# Patient Record
Sex: FEMALE | Race: WHITE | Employment: UNEMPLOYED | URBAN - METROPOLITAN AREA
[De-identification: names, ages, dates, MRNs, and addresses within clinical notes are randomized per-mention and may not be internally consistent; named-entity substitution may affect disease eponyms.]

---

## 2019-12-03 NOTE — PROGRESS NOTES
"Subjective     Pao Rizzo is a 59 year old female who presents to clinic today for the following health issues:    HPI   Est. Care. No primary care in the past 5-10 years.    Never had colonoscopy. No fam hx of colon cancer  Had mammogram around age 40. No fam hx of breast cancer  Pap/pelvic last done- when had ablation in .     Abnormal Mood Symptoms- states it is anxiety  Onset: \"more pronouced in the last two years\"   Had \"difficult boss\" past few years. Then last year mother  and at the same time got put \"on notice\".  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   Has had unemployment. She has not been looking for other work.   So exhausted initially, slept a lot and describes almost couldn't leave the house.   Got somewhat better over time  When asked what she has spent the last year doing has hard time describing. States hard to go out- \"crowds\" and social things bother her. She has not been exercising.   Lives w/fiance. He told her \"to get anxiety under control\"  States her anxiety was preventing her from look for work.   States she was drinking due to anxiety.   Does not think she would describe herself as depressed.   Drinking- she states she stopped 2 weeks ago. Was drinking 3-4x per week - \"few glasses\" of wine per day. Admits she did have some withdrawal sx when she stopped.   Mood- \"has been better since not drinking to cope.\"  Not sleeping well. Waking all the time.   Appetite is gone when she is anxious. But thinks lately better since quit drinking.   She is worried about her weight loss. Wondering \"if I should have some labs\".     Fam hx- dad schizophrenic.     Denies prior dx of depression or anxiety but states someone tried her on sertraline- \"it made me goofy. I won't take anti-depressants\".   States she wants something \"for when I need it\". \"Has a niece who has something she takes to calm her down when she goes out.\" wants something " like that. Although admits she has daily sx that impair function and have impaired her ability to start looking for work in the last year.       Description:   Depression: no   Anxiety: YES    Accompanying Signs & Symptoms:  Still participating in activities that you used to enjoy: YES  Fatigue: no   Irritability: no   Difficulty concentrating: no   Changes in appetite: YES  Problems with sleep: YES  Heart racing/beating fast : YES  Thoughts of hurting yourself or others: none    History:   Recent stress: YES  Prior depression hospitalization: None  Family history of depression: YES- father  Family history of anxiety: YES- father     Precipitating factors:   Alcohol/drug use: YES- in the past     Alleviating factors:    Therapies Tried and outcome: Zoloft (Sertraline)- pt dont like it.       PHQ-9 SCORE 12/4/2019   PHQ-9 Total Score MyChart 8 (Mild depression)   PHQ-9 Total Score 8     AGATA-7 SCORE 12/4/2019   Total Score 8 (mild anxiety)   Total Score 8       There is no problem list on file for this patient.    Past Surgical History:   Procedure Laterality Date     AS ABLATION, ENDOMETRIAL, THERMAL, W/O HYSTEROSCOPIC GUIDANCE  2007     BACK SURGERY  1990       Social History     Tobacco Use     Smoking status: Never Smoker     Smokeless tobacco: Never Used   Substance Use Topics     Alcohol use: Not Currently     Family History   Problem Relation Age of Onset     Heart Disease Father      Mental Illness Father          No current outpatient medications on file.     Allergies   Allergen Reactions     Aminobenzoate      No Clinical Screening - See Comments      PN: LW CM1: CONTRAST- NKA Reaction :     Triprolidine-Pse      Other reaction(s): *Unknown  reaction not stated     Sertraline Rash     Sulfa Drugs Rash     BP Readings from Last 3 Encounters:   12/04/19 130/82    Wt Readings from Last 3 Encounters:   12/04/19 54.4 kg (120 lb)                    Reviewed and updated as needed this visit by Provider      "    Review of Systems   ROS COMP: Constitutional, HEENT, cardiovascular, pulmonary, gi and gu systems are negative, except as otherwise noted.      Objective    /82   Pulse 88   Temp 97.8  F (36.6  C) (Oral)   Resp 16   Ht 1.715 m (5' 7.5\")   Wt 54.4 kg (120 lb)   SpO2 96%   BMI 18.52 kg/m    Body mass index is 18.52 kg/m .  Physical Exam   GENERAL: healthy, alert and no distress  EYES: Eyes grossly normal to inspection, PERRL and conjunctivae and sclerae normal  NECK: no adenopathy, no asymmetry, masses, or scars and thyroid normal to palpation  RESP: lungs clear to auscultation - no rales, rhonchi or wheezes  CV: regular rate and rhythm, normal S1 S2, no S3 or S4, no murmur, click or rub, no peripheral edema and peripheral pulses strong  ABDOMEN: slender, soft, nontender, possible enlarged liver, no masses and bowel sounds normal  MS: no gross musculoskeletal defects noted, no edema  NEURO: Normal strength and tone, mentation intact and speech normal  PSYCH: mentation appears normal, affect guarded. Eye contact intermittent. No SI/HI. Neatly groomed, casually dressed.     Diagnostic Test Results:  Labs reviewed in Epic  Results for orders placed or performed in visit on 12/04/19   Lipid panel reflex to direct LDL Non-fasting     Status: Abnormal   Result Value Ref Range    Cholesterol 206 (H) <200 mg/dL    Triglycerides 145 <150 mg/dL    HDL Cholesterol 68 >49 mg/dL    LDL Cholesterol Calculated 109 (H) <100 mg/dL    Non HDL Cholesterol 138 (H) <130 mg/dL   Comprehensive metabolic panel (BMP + Alb, Alk Phos, ALT, AST, Total. Bili, TP)     Status: Abnormal   Result Value Ref Range    Sodium 136 133 - 144 mmol/L    Potassium 3.6 3.4 - 5.3 mmol/L    Chloride 103 94 - 109 mmol/L    Carbon Dioxide 29 20 - 32 mmol/L    Anion Gap 4 3 - 14 mmol/L    Glucose 95 70 - 99 mg/dL    Urea Nitrogen 5 (L) 7 - 30 mg/dL    Creatinine 0.61 0.52 - 1.04 mg/dL    GFR Estimate >90 >60 mL/min/[1.73_m2]    GFR Estimate If " "Black >90 >60 mL/min/[1.73_m2]    Calcium 9.2 8.5 - 10.1 mg/dL    Bilirubin Total 1.1 0.2 - 1.3 mg/dL    Albumin 3.9 3.4 - 5.0 g/dL    Protein Total 7.6 6.8 - 8.8 g/dL    Alkaline Phosphatase 125 40 - 150 U/L     (H) 0 - 50 U/L     (H) 0 - 45 U/L   TSH with free T4 reflex     Status: None   Result Value Ref Range    TSH 1.71 0.40 - 4.00 mU/L   CBC with platelets and differential     Status: Abnormal   Result Value Ref Range    WBC 5.2 4.0 - 11.0 10e9/L    RBC Count 3.86 3.8 - 5.2 10e12/L    Hemoglobin 12.8 11.7 - 15.7 g/dL    Hematocrit 37.8 35.0 - 47.0 %    MCV 98 78 - 100 fl    MCH 33.2 (H) 26.5 - 33.0 pg    MCHC 33.9 31.5 - 36.5 g/dL    RDW 13.3 10.0 - 15.0 %    Platelet Count 138 (L) 150 - 450 10e9/L    % Neutrophils 64.9 %    % Lymphocytes 20.7 %    % Monocytes 13.2 %    % Eosinophils 0.8 %    % Basophils 0.4 %    Absolute Neutrophil 3.4 1.6 - 8.3 10e9/L    Absolute Lymphocytes 1.1 0.8 - 5.3 10e9/L    Absolute Monocytes 0.7 0.0 - 1.3 10e9/L    Absolute Eosinophils 0.0 0.0 - 0.7 10e9/L    Absolute Basophils 0.0 0.0 - 0.2 10e9/L    Diff Method Automated Method            Assessment & Plan     1. Anxiety  Discussed pts anxiety at length  She was let go from her job almost 1 yr ago and has not started looking for work.   Admits to moderate level of sx that impair daily function but is adamant she will not try an antidepressant.  Only wants \"something for when I need it\". Discuss with her degree of sx that would be daily.   She declines serotonin specific reuptake inhibitor, declines buspar    Offered referral to psych  in [place]- declines  She is interested in starting therapy- referral placed  Congratulated on stopping drinking- advised continued sobriety   - MENTAL HEALTH REFERRAL  - Adult; Outpatient Treatment; Individual/Couples/Family/Group Therapy/Health Psychology; [facility]: [facility] [phone]; We will contact you to schedule the appointment or please call with any " questions    2. Alcohol abuse  Likely alcohol abuse/dependence by based on the limited hx she provided and her admission of withdrawal sx  Also labs returned with significant elevations in ALT/AST. Pattern of AST> ALT typical of alcohol abuse and low platelets. This will need further evaluation    3. Screening for malignant neoplasm of breast  Pt overdue for routine maintenance  Advised mammogram. Order placed   - MA SCREENING DIGITAL BILAT - Future  (s+30); Future    4. Special screening for malignant neoplasms, colon  Pt has never had colon cancer screening  Discussed colonoscopy vs FIT test. She declines scope, is amenable to FIT test. Discussed a positive FIT is followed up w/diagnostic scope.  - Fecal colorectal cancer screen FIT; Future    5. Weight loss  She thinks she has had unplanned weight loss over the last year. She thinks it is due to her anxiety but is concerned could be related to other causes.  Advised updating cancer screenings.  Labs as below  Follow up pending eval   - Comprehensive metabolic panel (BMP + Alb, Alk Phos, ALT, AST, Total. Bili, TP)  - TSH with free T4 reflex  - CBC with platelets and differential    6. Screening for hyperlipidemia  - Lipid panel reflex to direct LDL Non-fasting    7. Need for vaccination  Given tdap  She declines flu shot   - TDAP VACCINE (ADACEL) [18604.002]  - 1st  Administration  [75550]       Follow Up: The patient was instructed to contact clinic for worsening symptoms, non-improvement as expected/discussed, and for questions regarding medications or treatment plan. Discussed parameters for follow up and included in After Visit Summary given to patient.      Return in about 4 weeks (around 1/1/2020).    Jalyn Lepe PA-C  [facility]    Answers for HPI/ROS submitted by the patient on 12/4/2019   If you checked off any problems, how difficult have these problems made it for you to do your work, take care of things at home, or get along with  other people?: Very difficult  PHQ9 TOTAL SCORE: 8  AGATA 7 TOTAL SCORE: 8

## 2019-12-04 ENCOUNTER — OFFICE VISIT (OUTPATIENT)
Dept: FAMILY MEDICINE | Facility: CLINIC | Age: 59
End: 2019-12-04
Payer: COMMERCIAL

## 2019-12-04 VITALS
HEART RATE: 88 BPM | SYSTOLIC BLOOD PRESSURE: 130 MMHG | RESPIRATION RATE: 16 BRPM | WEIGHT: 120 LBS | OXYGEN SATURATION: 96 % | TEMPERATURE: 97.8 F | DIASTOLIC BLOOD PRESSURE: 82 MMHG | BODY MASS INDEX: 18.19 KG/M2 | HEIGHT: 68 IN

## 2019-12-04 DIAGNOSIS — Z23 NEED FOR VACCINATION: ICD-10-CM

## 2019-12-04 DIAGNOSIS — F10.10 ALCOHOL ABUSE: ICD-10-CM

## 2019-12-04 DIAGNOSIS — Z12.39 SCREENING FOR MALIGNANT NEOPLASM OF BREAST: ICD-10-CM

## 2019-12-04 DIAGNOSIS — F41.9 ANXIETY: Primary | ICD-10-CM

## 2019-12-04 DIAGNOSIS — Z12.11 SPECIAL SCREENING FOR MALIGNANT NEOPLASMS, COLON: ICD-10-CM

## 2019-12-04 DIAGNOSIS — Z13.220 SCREENING FOR HYPERLIPIDEMIA: ICD-10-CM

## 2019-12-04 DIAGNOSIS — R63.4 WEIGHT LOSS: ICD-10-CM

## 2019-12-04 LAB
ALBUMIN SERPL-MCNC: 3.9 G/DL (ref 3.4–5)
ALP SERPL-CCNC: 125 U/L (ref 40–150)
ALT SERPL W P-5'-P-CCNC: 256 U/L (ref 0–50)
ANION GAP SERPL CALCULATED.3IONS-SCNC: 4 MMOL/L (ref 3–14)
AST SERPL W P-5'-P-CCNC: 342 U/L (ref 0–45)
BASOPHILS # BLD AUTO: 0 10E9/L (ref 0–0.2)
BASOPHILS NFR BLD AUTO: 0.4 %
BILIRUB SERPL-MCNC: 1.1 MG/DL (ref 0.2–1.3)
BUN SERPL-MCNC: 5 MG/DL (ref 7–30)
CALCIUM SERPL-MCNC: 9.2 MG/DL (ref 8.5–10.1)
CHLORIDE SERPL-SCNC: 103 MMOL/L (ref 94–109)
CHOLEST SERPL-MCNC: 206 MG/DL
CO2 SERPL-SCNC: 29 MMOL/L (ref 20–32)
CREAT SERPL-MCNC: 0.61 MG/DL (ref 0.52–1.04)
DIFFERENTIAL METHOD BLD: ABNORMAL
EOSINOPHIL # BLD AUTO: 0 10E9/L (ref 0–0.7)
EOSINOPHIL NFR BLD AUTO: 0.8 %
ERYTHROCYTE [DISTWIDTH] IN BLOOD BY AUTOMATED COUNT: 13.3 % (ref 10–15)
GFR SERPL CREATININE-BSD FRML MDRD: >90 ML/MIN/{1.73_M2}
GLUCOSE SERPL-MCNC: 95 MG/DL (ref 70–99)
HCT VFR BLD AUTO: 37.8 % (ref 35–47)
HDLC SERPL-MCNC: 68 MG/DL
HGB BLD-MCNC: 12.8 G/DL (ref 11.7–15.7)
LDLC SERPL CALC-MCNC: 109 MG/DL
LYMPHOCYTES # BLD AUTO: 1.1 10E9/L (ref 0.8–5.3)
LYMPHOCYTES NFR BLD AUTO: 20.7 %
MCH RBC QN AUTO: 33.2 PG (ref 26.5–33)
MCHC RBC AUTO-ENTMCNC: 33.9 G/DL (ref 31.5–36.5)
MCV RBC AUTO: 98 FL (ref 78–100)
MONOCYTES # BLD AUTO: 0.7 10E9/L (ref 0–1.3)
MONOCYTES NFR BLD AUTO: 13.2 %
NEUTROPHILS # BLD AUTO: 3.4 10E9/L (ref 1.6–8.3)
NEUTROPHILS NFR BLD AUTO: 64.9 %
NONHDLC SERPL-MCNC: 138 MG/DL
PLATELET # BLD AUTO: 138 10E9/L (ref 150–450)
POTASSIUM SERPL-SCNC: 3.6 MMOL/L (ref 3.4–5.3)
PROT SERPL-MCNC: 7.6 G/DL (ref 6.8–8.8)
RBC # BLD AUTO: 3.86 10E12/L (ref 3.8–5.2)
SODIUM SERPL-SCNC: 136 MMOL/L (ref 133–144)
TRIGL SERPL-MCNC: 145 MG/DL
TSH SERPL DL<=0.005 MIU/L-ACNC: 1.71 MU/L (ref 0.4–4)
WBC # BLD AUTO: 5.2 10E9/L (ref 4–11)

## 2019-12-04 PROCEDURE — 80061 LIPID PANEL: CPT | Performed by: PHYSICIAN ASSISTANT

## 2019-12-04 PROCEDURE — 36415 COLL VENOUS BLD VENIPUNCTURE: CPT | Performed by: PHYSICIAN ASSISTANT

## 2019-12-04 PROCEDURE — 80050 GENERAL HEALTH PANEL: CPT | Performed by: PHYSICIAN ASSISTANT

## 2019-12-04 PROCEDURE — 99204 OFFICE O/P NEW MOD 45 MIN: CPT | Mod: 25 | Performed by: PHYSICIAN ASSISTANT

## 2019-12-04 PROCEDURE — 96127 BRIEF EMOTIONAL/BEHAV ASSMT: CPT | Performed by: PHYSICIAN ASSISTANT

## 2019-12-04 PROCEDURE — 90471 IMMUNIZATION ADMIN: CPT | Performed by: PHYSICIAN ASSISTANT

## 2019-12-04 PROCEDURE — 90715 TDAP VACCINE 7 YRS/> IM: CPT | Performed by: PHYSICIAN ASSISTANT

## 2019-12-04 ASSESSMENT — MIFFLIN-ST. JEOR: SCORE: 1159.88

## 2019-12-04 ASSESSMENT — ANXIETY QUESTIONNAIRES
2. NOT BEING ABLE TO STOP OR CONTROL WORRYING: MORE THAN HALF THE DAYS
5. BEING SO RESTLESS THAT IT IS HARD TO SIT STILL: NOT AT ALL
4. TROUBLE RELAXING: MORE THAN HALF THE DAYS
3. WORRYING TOO MUCH ABOUT DIFFERENT THINGS: MORE THAN HALF THE DAYS
GAD7 TOTAL SCORE: 8
6. BECOMING EASILY ANNOYED OR IRRITABLE: NOT AT ALL
1. FEELING NERVOUS, ANXIOUS, OR ON EDGE: MORE THAN HALF THE DAYS
7. FEELING AFRAID AS IF SOMETHING AWFUL MIGHT HAPPEN: NOT AT ALL
7. FEELING AFRAID AS IF SOMETHING AWFUL MIGHT HAPPEN: NOT AT ALL

## 2019-12-04 ASSESSMENT — PAIN SCALES - GENERAL: PAINLEVEL: NO PAIN (0)

## 2019-12-04 ASSESSMENT — PATIENT HEALTH QUESTIONNAIRE - PHQ9
SUM OF ALL RESPONSES TO PHQ QUESTIONS 1-9: 8
SUM OF ALL RESPONSES TO PHQ QUESTIONS 1-9: 8
10. IF YOU CHECKED OFF ANY PROBLEMS, HOW DIFFICULT HAVE THESE PROBLEMS MADE IT FOR YOU TO DO YOUR WORK, TAKE CARE OF THINGS AT HOME, OR GET ALONG WITH OTHER PEOPLE: VERY DIFFICULT

## 2019-12-04 NOTE — LETTER
"December 6, 2019      Pao Rizzo  [address] [place] [address]        Dear ,    We are writing to inform you of your test results.    Test results indicate you may require additional follow up, see comment below.      Your liver tests ALT and AST were abnormal. This indicates inflammation and damage in the liver.  Normal enzyme levels are roughly below 50. Your AST was 342 and the ALT was 256. This should have further evaluation. I would like to see you back for a follow up visit to discuss and make a plan further evaluation. You mentioned you quit drinking alcohol a few weeks ago. I would strongly advise abstinence from all alcohol.      Your platelet levels (platelets form the plug with cuts) were mildly low and this can be from liver impairment or other causes. Can discuss at follow up. At this level, this is not causing you any noticeable symptoms.      Your thyroid test was normal.    Your kidney labs were normal.    Cholesterol was normal. Interpreting your Cholesterol Profile Results: The LDL is the \"bad\" cholesterol that can clog the arteries.  The HDL is protective or \"good cholesterol\"; exercise, a \"mediterranean diet\" and weight management can boost this level. Triglycerides are the sugary-fats in the blood; a diet high in carbohydrates (bread, pasta, rice, chips, sweets) and alcohol can raise this level. The ratio of HDL to total cholesterol under 5.0 is optimal.            Resulted Orders   Lipid panel reflex to direct LDL Non-fasting   Result Value Ref Range    Cholesterol 206 (H) <200 mg/dL      Comment:      Desirable:       <200 mg/dl    Triglycerides 145 <150 mg/dL    HDL Cholesterol 68 >49 mg/dL    LDL Cholesterol Calculated 109 (H) <100 mg/dL      Comment:      Above desirable:  100-129 mg/dl  Borderline High:  130-159 mg/dL  High:             160-189 mg/dL  Very high:       >189 mg/dl      Non HDL Cholesterol 138 (H) <130 mg/dL      Comment:      Above Desirable:  " 130-159 mg/dl  Borderline high:  160-189 mg/dl  High:             190-219 mg/dl  Very high:       >219 mg/dl     Comprehensive metabolic panel (BMP + Alb, Alk Phos, ALT, AST, Total. Bili, TP)   Result Value Ref Range    Sodium 136 133 - 144 mmol/L    Potassium 3.6 3.4 - 5.3 mmol/L    Chloride 103 94 - 109 mmol/L    Carbon Dioxide 29 20 - 32 mmol/L    Anion Gap 4 3 - 14 mmol/L    Glucose 95 70 - 99 mg/dL    Urea Nitrogen 5 (L) 7 - 30 mg/dL    Creatinine 0.61 0.52 - 1.04 mg/dL    GFR Estimate >90 >60 mL/min/[1.73_m2]      Comment:      Non  GFR Calc  Starting 12/18/2018, serum creatinine based estimated GFR (eGFR) will be   calculated using the Chronic Kidney Disease Epidemiology Collaboration   (CKD-EPI) equation.      GFR Estimate If Black >90 >60 mL/min/[1.73_m2]      Comment:       GFR Calc  Starting 12/18/2018, serum creatinine based estimated GFR (eGFR) will be   calculated using the Chronic Kidney Disease Epidemiology Collaboration   (CKD-EPI) equation.      Calcium 9.2 8.5 - 10.1 mg/dL    Bilirubin Total 1.1 0.2 - 1.3 mg/dL    Albumin 3.9 3.4 - 5.0 g/dL    Protein Total 7.6 6.8 - 8.8 g/dL    Alkaline Phosphatase 125 40 - 150 U/L     (H) 0 - 50 U/L     (H) 0 - 45 U/L   TSH with free T4 reflex   Result Value Ref Range    TSH 1.71 0.40 - 4.00 mU/L   CBC with platelets and differential   Result Value Ref Range    WBC 5.2 4.0 - 11.0 10e9/L    RBC Count 3.86 3.8 - 5.2 10e12/L    Hemoglobin 12.8 11.7 - 15.7 g/dL    Hematocrit 37.8 35.0 - 47.0 %    MCV 98 78 - 100 fl    MCH 33.2 (H) 26.5 - 33.0 pg    MCHC 33.9 31.5 - 36.5 g/dL    RDW 13.3 10.0 - 15.0 %    Platelet Count 138 (L) 150 - 450 10e9/L    % Neutrophils 64.9 %    % Lymphocytes 20.7 %    % Monocytes 13.2 %    % Eosinophils 0.8 %    % Basophils 0.4 %    Absolute Neutrophil 3.4 1.6 - 8.3 10e9/L    Absolute Lymphocytes 1.1 0.8 - 5.3 10e9/L    Absolute Monocytes 0.7 0.0 - 1.3 10e9/L    Absolute Eosinophils 0.0 0.0 - 0.7  10e9/L    Absolute Basophils 0.0 0.0 - 0.2 10e9/L    Diff Method Automated Method        If you have any questions or concerns, please call the clinic at the number listed above.       Sincerely,        Jalyn Lepe PA-C

## 2019-12-05 ASSESSMENT — PATIENT HEALTH QUESTIONNAIRE - PHQ9: SUM OF ALL RESPONSES TO PHQ QUESTIONS 1-9: 8

## 2019-12-05 ASSESSMENT — ANXIETY QUESTIONNAIRES: GAD7 TOTAL SCORE: 8

## 2019-12-06 ENCOUNTER — TELEPHONE (OUTPATIENT)
Dept: FAMILY MEDICINE | Facility: CLINIC | Age: 59
End: 2019-12-06

## 2019-12-06 NOTE — TELEPHONE ENCOUNTER
Jalyn Lepe PA-C Peterson, Katie Rae, PA-C             Please contact pt with the following message:     Her thyroid test was normal.   Her kidney labs were normal.   Cholesterol was normal.   Her liver tests (ALT and AST) were abnormal and I would like to see her back for a follow up visit to discuss and plan further evaluation.   Her platelet levels (form the plug with cuts) were mildly low and this can be from liver impairment or other causes.   I will send a letter also with details.   Jalyn Lepe PA-C          Left detailed message informing patient.

## 2019-12-10 ENCOUNTER — OFFICE VISIT (OUTPATIENT)
Dept: PSYCHOLOGY | Facility: CLINIC | Age: 59
End: 2019-12-10
Attending: PHYSICIAN ASSISTANT
Payer: COMMERCIAL

## 2019-12-10 DIAGNOSIS — F32.4 MAJOR DEPRESSIVE DISORDER, SINGLE EPISODE, IN PARTIAL REMISSION (H): ICD-10-CM

## 2019-12-10 DIAGNOSIS — F41.1 GENERALIZED ANXIETY DISORDER WITH PANIC ATTACKS: Primary | ICD-10-CM

## 2019-12-10 DIAGNOSIS — F41.0 GENERALIZED ANXIETY DISORDER WITH PANIC ATTACKS: Primary | ICD-10-CM

## 2019-12-10 PROCEDURE — 90791 PSYCH DIAGNOSTIC EVALUATION: CPT | Performed by: MARRIAGE & FAMILY THERAPIST

## 2019-12-10 ASSESSMENT — COLUMBIA-SUICIDE SEVERITY RATING SCALE - C-SSRS
2. HAVE YOU ACTUALLY HAD ANY THOUGHTS OF KILLING YOURSELF LIFETIME?: NO
1. IN THE PAST MONTH, HAVE YOU WISHED YOU WERE DEAD OR WISHED YOU COULD GO TO SLEEP AND NOT WAKE UP?: NO
ATTEMPT PAST THREE MONTHS: NO
1. IN THE PAST MONTH, HAVE YOU WISHED YOU WERE DEAD OR WISHED YOU COULD GO TO SLEEP AND NOT WAKE UP?: NO
2. HAVE YOU ACTUALLY HAD ANY THOUGHTS OF KILLING YOURSELF?: NO
ATTEMPT LIFETIME: NO

## 2019-12-10 ASSESSMENT — ANXIETY QUESTIONNAIRES
3. WORRYING TOO MUCH ABOUT DIFFERENT THINGS: NOT AT ALL
1. FEELING NERVOUS, ANXIOUS, OR ON EDGE: SEVERAL DAYS
5. BEING SO RESTLESS THAT IT IS HARD TO SIT STILL: NOT AT ALL
2. NOT BEING ABLE TO STOP OR CONTROL WORRYING: NOT AT ALL
6. BECOMING EASILY ANNOYED OR IRRITABLE: NOT AT ALL
4. TROUBLE RELAXING: NOT AT ALL
7. FEELING AFRAID AS IF SOMETHING AWFUL MIGHT HAPPEN: NOT AT ALL
GAD7 TOTAL SCORE: 1

## 2019-12-10 ASSESSMENT — PATIENT HEALTH QUESTIONNAIRE - PHQ9: SUM OF ALL RESPONSES TO PHQ QUESTIONS 1-9: 3
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
## 2019-12-10 NOTE — PATIENT INSTRUCTIONS
Patient scheduled a return visit for Tues., Dec. 17 at 12:30 p.m.  Patient completed PHQ-9 and AGATA-7 on this date.  Patient states that she would like to discuss setting financial and emotional boundaries with his two adult sons (ages 36 & 33).

## 2019-12-11 ASSESSMENT — ANXIETY QUESTIONNAIRES: GAD7 TOTAL SCORE: 1

## 2019-12-17 ENCOUNTER — OFFICE VISIT (OUTPATIENT)
Dept: PSYCHOLOGY | Facility: CLINIC | Age: 59
End: 2019-12-17
Payer: COMMERCIAL

## 2019-12-17 DIAGNOSIS — F41.0 GENERALIZED ANXIETY DISORDER WITH PANIC ATTACKS: ICD-10-CM

## 2019-12-17 DIAGNOSIS — F41.1 GENERALIZED ANXIETY DISORDER WITH PANIC ATTACKS: ICD-10-CM

## 2019-12-17 DIAGNOSIS — F32.4 MAJOR DEPRESSIVE DISORDER, SINGLE EPISODE, IN PARTIAL REMISSION (H): Primary | ICD-10-CM

## 2019-12-17 PROCEDURE — 90834 PSYTX W PT 45 MINUTES: CPT | Performed by: MARRIAGE & FAMILY THERAPIST

## 2019-12-17 NOTE — PROGRESS NOTES
Progress Note    Patient Name: Pao Rizzo  Date: 12/17/19         Service Type: Individual  Video Visit: No     Session Start Time: 12:36 p.m.  Session End Time: 1:36 p.m.     Session Length: 60 minutes    Session #: 2    Attendees: Client attended alone     Treatment Plan Last Reviewed: in development  PHQ-9 / AGATA-7 : due at next session on 1/3/20    DATA  Interactive Complexity: No  Crisis: No       Progress Since Last Session (Related to Symptoms / Goals / Homework):   Symptoms: Improving per patient's report that she has been doing crafts and cooking more - denies alcohol use and endorses improved appetite/nutrition    Homework: Achieved / completed to satisfaction (finished crafting)      Episode of Care Goals: Satisfactory progress - PREPARATION (Decided to change - considering how); Intervened by negotiating a change plan and determining options / strategies for behavior change, identifying triggers, exploring social supports, and working towards setting a date to begin behavior change     Current / Ongoing Stressors and Concerns:   Grief/loss, co-parenting issues re: adult sons, lack of appetite due to anxiety/recent weight loss, job loss during past year/no current employment     Treatment Objective(s) Addressed in This Session:   identify 2-3 fears / thoughts that contribute to feeling anxious  practice setting boundaries three times in the next three weeks     Intervention:   CBT: identified nature/manifestation of anxiety and triggers  Solution Focused: identified current stressors and generated ideas about how to manage them  Experiential: address in-session experience of sitting w/patient        ASSESSMENT: Current Emotional / Mental Status (status of significant symptoms):   Risk status (Self / Other harm or suicidal ideation)   Patient denies current fears or concerns for personal safety.   Patient denies current or recent suicidal ideation or  behaviors.   Patientdenies current or recent homicidal ideation or behaviors.   Patient denies current or recent self injurious behavior or ideation.   Patient denies other safety concerns.   Patient Patient reports there has been no change in risk factors since their last session.     PatientPatient reports there has been no change in protective factors since their last session.     Recommended that patient call 911 or go to the local ED should there be a change in any of these risk factors.     Appearance:   Appropriate    Eye Contact:   Good    Psychomotor Behavior: Normal    Attitude:   Cooperative  Guarded    Orientation:   All   Speech    Rate / Production: Normal     Volume:  Normal    Mood:    Normal   Affect:    Appropriate  Subdued    Thought Content:  Clear  (patient tends to avoid talking about herself)    Thought Form:  Blocking  Coherent  Logical    Insight:    Fair      Medication Review:   No current psychiatric medications prescribed     Medication Compliance:   NA     Changes in Health Issues:   None reported     Chemical Use Review:   Substance Use: decrease in alcohol .  Patient reports frequency of use as 0 in past four weeks.  Reviewed concerns related to health related substance abuse risk  Provided support and affirmation for steps taken towards sobriety   Consulted with PCP about current impact on health   (previously, after intake appt.)     Tobacco Use: No current tobacco use.      Diagnosis:  1. Major depressive disorder, single episode, in partial remission (H)    2. Generalized anxiety disorder with panic attacks        Collateral Reports Completed:   Routed note to PCP    PLAN: (Patient Tasks / Therapist Tasks / Other)  Patient scheduled a return visit for Fri., Jan. 3 at 1 p.m.  Patient states that she has been eating more and has been more mindful about her need to eat; patient reports that she has been making meals that she is craving (goulash and split pea soup).  Patient also  denies drinking alcohol during the past week.  Patient discussed parenting issues and setting boundaries with her fiance's sons.      Jenni Sarah                                                         ______________________________________________________________________    Treatment Plan    Patient's Name: Pao Rizzo  YOB: 1960    Date: 1/3/2020    DSM5 Diagnoses: 296.25 (F32.4)  Major Depressive Disorder, Single Episode, In partial remssion _ or 300.02 (F41.1) Generalized Anxiety Disorder  Psychosocial / Contextual Factors:  Grief/loss, co-parenting issues re: adult sons, lack of appetite due to anxiety/recent weight loss, job loss during past year/no current employment  WHODAS 2.0 Total Score 12/10/2019   Total Score 16       Referral / Collaboration:  Referral to another professional/service is not indicated at this time.  Will refer for CD assessment if needed in future.    Anticipated number of session or this episode of care: 10-12      MeasurableTreatment Goal(s) related to diagnosis / functional impairment(s)  Goal 1: Patient will identify triggers for anxiety/depression and increase her protective factors    Objective #A (Patient Action)    Patient will identify at least 4-5 triggers for anxiety.  Status: New - Date: 1/3/20     Intervention(s)  Therapist will teach emotional recognition/identification.    Objective #B  Patient will identify 4-5 strategies for improving mood.  Status: New - Date: 1/3/20     Intervention(s)  Therapist will teach distraction skills.        Goal 2: Patient will reduce her use of alcohol and will practice daily mindful, healthy eating.    Objective #A (Patient Action)    Status: New - Date: 1/3/20     Patient will maintain sobriety for one month.    Intervention(s)  Therapist will provide educational materials on chemical use and refer for CD assessment if needed.    Objective #B  Patient will utilize up to 5 techniques that help extend mealtime to  20-30 minutes: focus on slow breathing and eat with others and focus on conversation.    Status: New - Date: 1/3/20     Intervention(s)  Therapist will teach mindfulness skills.      Goal 3: Patient will improve her ability to set and enforce boundaries with her fiance and their boys.    Objective #A (Patient Action)    Status: New - Date: 1/3/20     Patient will compile a list of boundaries that they would like to set with others.    Intervention(s)  Therapist will teach how to choose an intensity for asking or saying 'no'.    Objective #B  Patient will practice setting boundaries 5 times in the next 5 weeks.    Status: New - Date: 1/3/20     Intervention(s)  Therapist will teach how to choose an intensity for asking or saying 'no'.    Goal 4 : Patient will identify unresolved grief and loss related to the death of her parents and the loss of her job.    Objective #A (Client Action)    Client will identify how the use of substances contributes to the avoidance of feeling associated with the loss.  Status: New - Date: 1/3/20     Intervention(s)  Therapist will provide educational materials on AA/support groups if needed.    Objective #B  Client will increase understanding of steps in the grief process.    Status: New - Date: 1/3/20     Intervention(s)  Therapist will teach stages of grief and tasks of mourning.      Patient has not reviewed nor agreed to the above plan; will review on 1/3/20.      Jenni Sarah  December 17, 2019

## 2019-12-17 NOTE — PATIENT INSTRUCTIONS
Patient scheduled a return visit for Fri., Aaron. 3 at 1 p.m.  Patient states that she has been eating more and has been more mindful about her need to eat; patient reports that she has been making meals that she is craving (goulash and split pea soup).  Patient also denies drinking alcohol during the past week.  Patient discussed parenting issues and setting boundaries with her fiance's sons.

## 2020-01-24 ENCOUNTER — TELEPHONE (OUTPATIENT)
Dept: FAMILY MEDICINE | Facility: CLINIC | Age: 60
End: 2020-01-24

## 2020-01-24 NOTE — TELEPHONE ENCOUNTER
Summary:    Patient is due/failing the following:   Depression follow up, FIT, MAMMOGRAM, PAP, PHQ9 and PHYSICAL    Action needed:   Patient needs office visit for Physical/PAP . and schedule a mammogram and complete a FIT test    Type of outreach:    Phone, left message for patient to call back.     Questions for provider review:    None                                                                                                                                    [facility]       Chart routed to Care Team .          Panel Management Review      Patient has the following on her problem list:     Depression / Dysthymia review    Measure:  Needs PHQ-9 score of 4 or less during index window.  Administer PHQ-9 and if score is 5 or more, send encounter to provider for next steps.    PHQ-9 SCORE 12/4/2019 12/10/2019   PHQ-9 Total Score MyChart 8 (Mild depression) -   PHQ-9 Total Score 8 3       If PHQ-9 recheck is 5 or more, route to provider for next steps.    Patient is due for:  PHQ9      Composite cancer screening  Chart review shows that this patient is due/due soon for the following Pap Smear, Mammogram and Colonoscopy

## 2020-01-24 NOTE — LETTER
[facility] (formerly [facility])  [address]  [place] [address]  Phone: [phone]  Fax: [phone]  February 24, 2020      Pao Rizzo  [address] [place] [address]      Dear Pao,    We care about your health and have reviewed your health plan including your medical conditions, medications, and lab results.  Based on this review, it is recommended that you follow up regarding the following health topic(s):  -Breast Cancer Screening  -Colon Cancer Screening  -Cervical Cancer Screening  -Wellness (Physical) Visit     We recommend you take the following action(s):  -schedule a MAMMOGRAM which is due. Please disregard this reminder if you have had this exam elsewhere within the last 1-2 years please let us know so we can update your records.  -schedule a COLONOSCOPY to look for colon cancer (due every 10 years or 5 years in higher risk situations.)  Colonoscopies can prevent 90-95% of colon cancer deaths.  Problem lesions can be removed before they ever become cancer.  If you do not wish to do a colonoscopy or cannot afford to do one at this time, there is another option called a Fecal Immunochemical Occult Blood Test (FIT) a take home stool sample kit.  It does not replace the colonoscopy for colorectal cancer screening, but it can detect hidden bleeding in the lower colon.  It does need to be repeated every year and if a positive result is obtained, you would be referred for a colonoscopy.  If you have completed either one of these tests at another facility, please have the records sent to our clinic for our records.  -schedule a PAP SMEAR EXAM which is due.  Please disregard this reminder if you have had this exam elsewhere within the last year.  It would be helpful for us to have a copy of your recent pap smear report to update your records.     Please call us at the [facility] - [phone] (or use Rackspace) to address the above recommendations.     Thank  you for trusting [facility] and we appreciate the opportunity to serve you.  We look forward to supporting your healthcare needs in the future.    Healthy Regards,    Your Health Care Team  [facility]

## 2020-02-24 ENCOUNTER — FCC EXTENDED DOCUMENTATION (OUTPATIENT)
Dept: PSYCHOLOGY | Facility: CLINIC | Age: 60
End: 2020-02-24

## 2020-02-24 NOTE — PROGRESS NOTES
Discharge Summary  Multiple Sessions    Client Name: Pao Rizzo MRN#: [id number] YOB: 1960      Intake / Discharge Date: 12/10/19 & 2/24/2020      DSM5 Diagnoses: (Sustained by DSM5 Criteria Listed Above)  Diagnoses: 296.25 (F32.4)  Major Depressive Disorder, Single Episode, In partial remssion _  300.02 (F41.1) Generalized Anxiety Disorder  Psychosocial & Contextual Factors: Grief/loss, co-parenting issues re: adult sons, lack of appetite due to anxiety/recent weight loss, job loss during past year/no current employment  WHODAS 2.0 Total Score 12/10/2019   Total Score 16     PHQ 12/4/2019 12/10/2019   PHQ-9 Total Score 8 3   Q9: Thoughts of better off dead/self-harm past 2 weeks Not at all Not at all     AGATA-7 SCORE 12/4/2019 12/10/2019   Total Score 8 (mild anxiety) -   Total Score 8 1          Presenting Concern:  Anxiety (provider concerned about liver functioning/alcohol use)      Reason for Discharge:  Client did not return      Disposition at Time of Last Encounter:   Comments:   Patient appeared to have had minor improvement in her anxiety but declined to fully disclose her drinking     Risk Management:   Client denies a history of suicidal ideation, suicide attempts, self-injurious behavior, homicidal ideation, homicidal behavior and and other safety concerns  Recommended that patient call 911 or go to the local ED should there be a change in any of these risk factors.      Referred To:  PCP and possible CD ; patient is welcome to resume services with [facility] in the future.        Jenni Sarah   2/24/2020

## 2020-05-19 ENCOUNTER — VIRTUAL VISIT (OUTPATIENT)
Dept: FAMILY MEDICINE | Facility: OTHER | Age: 60
End: 2020-05-19
Payer: COMMERCIAL

## 2020-05-19 DIAGNOSIS — S30.861A TICK BITE OF ABDOMINAL WALL, INITIAL ENCOUNTER: Primary | ICD-10-CM
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
## 2020-05-19 PROCEDURE — 99213 OFFICE O/P EST LOW 20 MIN: CPT | Mod: 95 | Performed by: PHYSICIAN ASSISTANT

## 2020-05-19 NOTE — PROGRESS NOTES
"Feliz Rizzo is a 60 year old female who is being evaluated via a billable video visit.      The patient has been notified of following:     \"This video visit will be conducted via a call between you and your physician/provider. We have found that certain health care needs can be provided without the need for an in-person physical exam.  This service lets us provide the care you need with a video conversation.  If a prescription is necessary we can send it directly to your pharmacy.  If lab work is needed we can place an order for that and you can then stop by our lab to have the test done at a later time.    Video visits are billed at different rates depending on your insurance coverage.  Please reach out to your insurance provider with any questions.    If during the course of the call the physician/provider feels a video visit is not appropriate, you will not be charged for this service.\"    Patient has given verbal consent for Video visit? Yes    How would you like to obtain your AVS? Mail a copy    Patient would like the video invitation sent by: Send to e-mail at: No e-mail address on [email or web address]    Will anyone else be joining your video visit? No    Subjective     AM well invite sent at: 1:33pm  Resent invite 1:43pm.   Staff called pt at 1:50pm. Pt having trouble.  Converted to doximity 1:56pm. Difficulty with connection and intermittent audio interruption.  Doximity End time: 1:58pm  Called at 1:59pm. Phone visit for 10 minutes.   Doximity attempted again from different location in the building at 2:10pm-2:15pm. Able to do video portion of visit.     Pao Rizzo is a 60 year old female who presents today via video visit for the following health issues:    HPI  Concern - Possible tick bite  Onset: 4 days  4 days ago was out hunting mushrooms w/significant other. Did tick check after and found it.   Not attached long- she thinks less than an hour.   On her left side of lower abdomen. In " an area can easily see it.   Removed all of it. It was not engorged.  The next day bite site was red, itching.   No drainage from site.  No fevers, streaking, no myalgia.  She had headache yest and was tired so thought she should call.   Pink around the site. Not expanding or getting worse. Maybe bit better.  Very itchy. She tends to react significantly to any bug bites.    No other ticks on her.    Description:   Tick bite on left side of torso, was able to remove tick completely.    Intensity: moderate    Progression of Symptoms:  worsening and same    Accompanying Signs & Symptoms:  Red, tender, itchy, headache, fatigue, nausea    Previous history of similar problem:   None    Precipitating factors:   Worsened by:     Alleviating factors:  Improved by: None    Therapies Tried and outcome: hydrogen peroxide and neosporin       Video Start Time: see above       Patient Active Problem List   Diagnosis     Anxiety     Major depressive disorder, single episode, in partial remission (H)     Past Surgical History:   Procedure Laterality Date     AS ABLATION, ENDOMETRIAL, THERMAL, W/O HYSTEROSCOPIC GUIDANCE  2007     BACK SURGERY  1990       Social History     Tobacco Use     Smoking status: Never Smoker     Smokeless tobacco: Never Used   Substance Use Topics     Alcohol use: Not Currently     Family History   Problem Relation Age of Onset     Heart Disease Father      Mental Illness Father          No current outpatient medications on file.     Allergies   Allergen Reactions     Aminobenzoate      No Clinical Screening - See Comments      PN: LW CM1: CONTRAST- NKA Reaction :     Triprolidine-Pse      Other reaction(s): *Unknown  reaction not stated     Sertraline Rash     Sulfa Drugs Rash     BP Readings from Last 3 Encounters:   12/04/19 130/82    Wt Readings from Last 3 Encounters:   12/04/19 54.4 kg (120 lb)                    Reviewed and updated as needed this visit by Provider         Review of Systems  "  Constitutional, HEENT, cardiovascular, pulmonary, gi and gu systems are negative, except as otherwise noted.      Objective    There were no vitals taken for this visit.  Estimated body mass index is 18.52 kg/m  as calculated from the following:    Height as of 12/4/19: 1.715 m (5' 7.5\").    Weight as of 12/4/19: 54.4 kg (120 lb).  Physical Exam     GENERAL: Healthy, alert and no distress  EYES: Eyes grossly normal to inspection.  No discharge or erythema, or obvious scleral/conjunctival abnormalities.  RESP: No audible wheeze, cough, or visible cyanosis.  No visible retractions or increased work of breathing.    SKIN: somewhat limited by video quality: left lower torso : small central pink bite site, few mm of light pink surrounding. Can see 2 pink patches of dermatitis from bandaid adhesive. No significant redness, no drainage, no lympathic streaking.   NEURO: Cranial nerves grossly intact.  Mentation and speech appropriate for age.  PSYCH: Mentation appears normal, affect normal/bright, judgement and insight intact, normal speech and appearance well-groomed.      Diagnostic Test Results:  none         Assessment & Plan     1. Tick bite of abdominal wall, initial encounter  Tick was on 1 hr. Very low risk for tick borne disease transmission.   Appears typical bite reaction.  Appearance more typical of inflammation vs infection.   Area around bit is less red than areas irritated by bandaid adhesive.   Ice and hydrocortisone for itch  Close follow up if s/sx of cellulitis. Discussed s/sx for cellulitis.       Follow Up: The patient was instructed to contact clinic for worsening symptoms, non-improvement as expected/discussed, and for questions regarding medications or treatment plan. Discussed parameters for follow up and included in After Visit Summary given to patient.      No follow-ups on file.    Jalyn Lepe PA-C  [facility]      Video-Visit Details    Type of service:  Video " Visit    Video End Time:see above     Originating Location (pt. Location): Home    Distant Location (provider location):  [facility]     Platform used for Video Visit: Other: see above. doximity    Return in about 1 week (around 5/26/2020) for Recheck if needed for non-improvement.       DIANA LawrenceC

## 2020-05-20 NOTE — PATIENT INSTRUCTIONS
Video image appears mild inflammation from the bite.   Does not appear infected.     Ok to use hydrocortisone, ice for the itching.    Should be seen for urgent evaluation if: new fever (at or above 100.4), expanding redness from site, red streaking from site, significant increase in pain, increase in swelling, feeling sick/flu-like.

## 2020-05-22 ENCOUNTER — VIRTUAL VISIT (OUTPATIENT)
Dept: FAMILY MEDICINE | Facility: OTHER | Age: 60
End: 2020-05-22
Payer: COMMERCIAL

## 2020-05-22 ENCOUNTER — TELEPHONE (OUTPATIENT)
Dept: FAMILY MEDICINE | Facility: CLINIC | Age: 60
End: 2020-05-22
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
## 2020-05-22 PROCEDURE — 99213 OFFICE O/P EST LOW 20 MIN: CPT | Mod: 95 | Performed by: FAMILY MEDICINE

## 2020-05-22 RX ORDER — DOXYCYCLINE 100 MG/1
100 CAPSULE ORAL 2 TIMES DAILY
Qty: 10 CAPSULE | Refills: 0 | Status: SHIPPED | OUTPATIENT
Start: 2020-05-22 | End: 2020-05-27

## 2020-05-22 NOTE — TELEPHONE ENCOUNTER
Patient had a virtual visit with [facility] on 5/19/20 regarding a tick bite and she called today due to that spot is so very,very, itchy where she can't stand it.  Wondering what she can do about that... Please advise.

## 2020-05-22 NOTE — TELEPHONE ENCOUNTER
Per 5/19/20 visit note, bite was red and very itchy at that time as well.    Returned patient's call. She does not feel well - feels achy and fatigued. Redness at bite is Denies fever or chills. Redness around the bite is the same as it was   Not hot to the touch, no drainage. Has not healed up at all since first bit, 1 week ago.    Has using a baking soda paste for itch. Has not tried hydrocortisone or ice as recommended by .  Would like to discuss starting an ABX, as she is feeling fluish now and bite has not started to heal.    Scheduled for video visit with Dr. Conroy for today, as KP is out of clinic.  Instructed her to download AW Touchpoint, as 5/19 video visit with another video platform did not work well per pt.    Cordelia Alaniz, BRAVON, RN, PHN

## 2020-05-22 NOTE — TELEPHONE ENCOUNTER
Reason for Call:  Other prescription    Detailed comments: patient prescribed antibiotic and it says not to take if allergic to sulfate. She is allergic. Please call her    Phone Number Patient can be reached at: Home number on file [phone] (home)    Best Time: any    Can we leave a detailed message on this number? YES    Call taken on 5/22/2020 at 2:19 PM by Cortney Hidalgo

## 2020-05-22 NOTE — PATIENT INSTRUCTIONS
"  Patient Education     Tick Bites  Ticks are small arachnids that feed on the blood of rodents, rabbits, birds, deer, dogs, and people. A tick bite may cause a reaction like a spider bite. You may have redness, itching, and slight swelling at the site. Sometimes you may have no reaction where the tick bit you.  Ticks may gorge themselves for days before you find and remove them. The bites themselves aren't cause for concern. But ticks can carry and pass on illnesses such as Lyme disease and [place] spotted fever. Both diseases begin with a rash and symptoms similar to the flu. In advanced stages, these diseases can be quite serious.     A \"bull's eye\" rash is a common symptom of Lyme disease.   When to go to the emergency room (ER)  Not all ticks carry disease. And a tick must stay attached for at least 24 hours to infect you. If you find a tick, don't panic. Try to carefully remove it with tweezers. Grasp the tick near its head and pull without twisting. If you can't easily dislodge the tick or if you leave the head in your skin, get medical care right away.  What to expect in the ER    The tick or any parts of the tick will be removed and the bite will be cleaned.    To prevent disease, you may be given antibiotics. Both Lyme disease and [place] spotted fever respond quickly to these medicines.    You may be asked to see your healthcare provider for a blood test to check for Lyme disease.  Follow-up care  Some states and counties have services that test ticks for Lyme disease and other diseases. Check with your local officials to see if this service is available in your area.  If you remove a tick yourself, watch for signs of a tick-borne illness. Symptoms may show up within a few days or weeks after a bite. Call your healthcare provider if you notice any of the following:    Rash. The rash may spread outward in a ring from a hard white lump. Or it may move up your arms and legs to your " chest.    Chills and fever    Body aches and joint pain    Severe headache  Date Last Reviewed: 12/1/2016 2000-2019 [employer]. [address] [place] [address], [place] [address]. All rights reserved. This information is not intended as a substitute for professional medical care. Always follow your healthcare professional's instructions.

## 2020-05-22 NOTE — PROGRESS NOTES
"Pao Rizzo is a 60 year old female who is being evaluated via a billable video visit.      The patient has been notified of following:     \"This video visit will be conducted via a call between you and your physician/provider. We have found that certain health care needs can be provided without the need for an in-person physical exam.  This service lets us provide the care you need with a video conversation.  If a prescription is necessary we can send it directly to your pharmacy.  If lab work is needed we can place an order for that and you can then stop by our lab to have the test done at a later time.    Video visits are billed at different rates depending on your insurance coverage.  Please reach out to your insurance provider with any questions.    If during the course of the call the physician/provider feels a video visit is not appropriate, you will not be charged for this service.\"    Patient has given verbal consent for Video visit? Yes    How would you like to obtain your AVS? Mail a copy    Patient would like the video invitation sent by: Text to cell phone: [phone]    Will anyone else be joining your video visit? No      Subjective     Pao Rizzo is a 60 year old female who presents today via video visit for the following health issues:    HPI     Concern - Recheck tick bite  Onset: a week     Description:   Tick bite on left side, pt feels that it isn't any better. Still red and raised. Still very itchy, almost a painful itch.     Therapies Tried and outcome: hydrocortisone    Was seen on 5/19/20. Noted to have found tick after a few hours after out picking mushrooms. Has tried hydrocortisone without relief of itching and starting to become painful. Denies fevers or chills or other rash. Had a headache earlier in the week and felt nauseas.     No other concerns today.    Video Start Time: 1:03 PM    Patient Active Problem List   Diagnosis     Anxiety     Major depressive disorder, " "single episode, in partial remission (H)     Past Surgical History:   Procedure Laterality Date     AS ABLATION, ENDOMETRIAL, THERMAL, W/O HYSTEROSCOPIC GUIDANCE  2007     BACK SURGERY  1990       Social History     Tobacco Use     Smoking status: Never Smoker     Smokeless tobacco: Never Used   Substance Use Topics     Alcohol use: Not Currently     Family History   Problem Relation Age of Onset     Heart Disease Father      Mental Illness Father          Current Outpatient Medications   Medication Sig Dispense Refill     doxycycline hyclate (VIBRAMYCIN) 100 MG capsule Take 1 capsule (100 mg) by mouth 2 times daily for 5 days 10 capsule 0     Allergies   Allergen Reactions     Aminobenzoate      No Clinical Screening - See Comments      PN: LW CM1: CONTRAST- NKA Reaction :     Triprolidine-Pse      Other reaction(s): *Unknown  reaction not stated     Sertraline Rash     Sulfa Drugs Rash     Reviewed and updated as needed this visit by Provider  Tobacco  Allergies  Meds  Problems  Med Hx  Surg Hx  Fam Hx       Review of Systems   Constitutional, HEENT, cardiovascular, pulmonary, gi and gu systems are negative, except as otherwise noted.      Objective    There were no vitals taken for this visit.  Estimated body mass index is 18.52 kg/m  as calculated from the following:    Height as of 12/4/19: 1.715 m (5' 7.5\").    Weight as of 12/4/19: 54.4 kg (120 lb).  Physical Exam   GENERAL: Healthy, alert and no distress  EYES: Eyes grossly normal to inspection.  No discharge or erythema, or obvious scleral/conjunctival abnormalities.  RESP: No audible wheeze, cough, or visible cyanosis.  No visible retractions or increased work of breathing.    SKIN: ~1.5 cm of erythema around bite on abdomen. No significant spreading erythema. Other visible skin clear. No significant rash, abnormal pigmentation or lesions.  NEURO: Cranial nerves grossly intact.  Mentation and speech appropriate for age.  PSYCH: Mentation appears " normal, affect normal/bright, judgement and insight intact, normal speech and appearance well-groomed.      Diagnostic Test Results:  none         Assessment & Plan   1. Tick bite, subsequent encounter: Likely local inflammation. Can give antibiotic for 5 days given associated pain. Treat itching with hydrocortisone as previously instructed. Can also use ice, zyrtec, benadryl. Follow-up if not improving. Discussed light sensitivity with doxycycline. Do not believe tick was attached long enough for lyme to be a concern. Doxy would also cover this.  - doxycycline hyclate (VIBRAMYCIN) 100 MG capsule; Take 1 capsule (100 mg) by mouth 2 times daily for 5 days  Dispense: 10 capsule; Refill: 0    Return in about 1 week (around 5/29/2020), or if symptoms worsen or fail to improve.    Jeferson Martin MD  [facility]    This chart is completed utilizing dictation software; typos and/or incorrect word substitutions may unintentionally occur.      Video-Visit Details    Type of service:  Video Visit    Video End Time:1:09 PM    Originating Location (pt. Location): Home    Distant Location (provider location):  [facility]     Platform used for Video Visit: [employer]

## 2020-05-27 NOTE — TELEPHONE ENCOUNTER
Pt informed.  She stated she took one pill and was sick to her stomach.  She did not break out in hives, but was not feeling well for 24 hours.    Pt stated she does not want a different medication in place of this, though the tick bite still itches.    FYI to Provider.

## 2020-10-29 ENCOUNTER — VIRTUAL VISIT (OUTPATIENT)
Dept: PSYCHOLOGY | Facility: CLINIC | Age: 60
End: 2020-10-29
Payer: COMMERCIAL

## 2020-10-29 DIAGNOSIS — F41.9 ANXIETY: ICD-10-CM

## 2020-10-29 DIAGNOSIS — F33.0 MAJOR DEPRESSIVE DISORDER, RECURRENT EPISODE, MILD (H): Primary | ICD-10-CM

## 2020-10-29 PROCEDURE — 90834 PSYTX W PT 45 MINUTES: CPT | Mod: 95 | Performed by: MARRIAGE & FAMILY THERAPIST

## 2020-10-29 ASSESSMENT — ANXIETY QUESTIONNAIRES
6. BECOMING EASILY ANNOYED OR IRRITABLE: NOT AT ALL
1. FEELING NERVOUS, ANXIOUS, OR ON EDGE: MORE THAN HALF THE DAYS
7. FEELING AFRAID AS IF SOMETHING AWFUL MIGHT HAPPEN: NOT AT ALL
GAD7 TOTAL SCORE: 5
4. TROUBLE RELAXING: SEVERAL DAYS
5. BEING SO RESTLESS THAT IT IS HARD TO SIT STILL: NOT AT ALL
3. WORRYING TOO MUCH ABOUT DIFFERENT THINGS: SEVERAL DAYS
2. NOT BEING ABLE TO STOP OR CONTROL WORRYING: SEVERAL DAYS

## 2020-10-29 ASSESSMENT — PATIENT HEALTH QUESTIONNAIRE - PHQ9: SUM OF ALL RESPONSES TO PHQ QUESTIONS 1-9: 8

## 2020-10-29 NOTE — PATIENT INSTRUCTIONS
Emailed patient resources on [employer] meetings and a link to a therapist specializing in ADHD.

## 2020-10-29 NOTE — PROGRESS NOTES
Progress Note    Patient Name: Pao Rizzo  Date: 10/29/2020         Service Type: Individual  Video Visit: Yes, the patient's condition can be safely assessed and treated via synchronous audio and visual telemedicine encounter.      Reason for Video Visit: Services only offered telehealth    Location of Originating Site: Patient's home    Distant Site: [facility]: [place]     Session Start Time: 8:32 a.m.  Session End Time: 9:31 a.m.     Session Length: 59 minutes    Session #: 3    Attendees: Client attended alone     Treatment Plan Last Reviewed: 10/29/2020  PHQ-9 / AGATA-7: 10/29/2020    Telemedicine Visit: The patient's condition can be safely assessed and treated via synchronous audio and visual telemedicine encounter.      Reason for Telemedicine Visit: Services only offered telehealth    Originating Site (Patient Location): Patient's home    Distant Site (Provider Location): [facility]: [place]    Consent:  The patient/guardian has verbally consented to: the potential risks and benefits of telemedicine (video visit) versus in person care; bill my insurance or make self-payment for services provided; and responsibility for payment of non-covered services.     Mode of Communication:  Video Conference via [employer]    As the provider I attest to compliance with applicable laws and regulations related to telemedicine.    DATA  Interactive Complexity: No  Crisis: No       Progress Since Last Session (Related to Symptoms / Goals / Homework):   Symptoms: fluctuating since Jan. 2020 as patient admits that she started drinking heavily at times and realizes that her emotional state tends to be tied to her son's emotional state; patient states that she is very concerned about his mental health and depression.    Homework: N/A - no sessions since Dec. 2019      Episode of Care Goals: Satisfactory progress - PREPARATION (Decided to change -  considering how); Intervened by negotiating a change plan and determining options / strategies for behavior change, identifying triggers, exploring social supports, and working towards setting a date to begin behavior change     Current / Ongoing Stressors and Concerns:   Has had bouts of heavy drinking in past 10 months; concerns about son's mental health and pending divorce; grief/loss, co-parenting issues re: adult sons, lack of appetite due to anxiety/low appetite/recent weight loss, some financial concerns; 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     Treatment Objective(s) Addressed in This Session:   identify 2-3 fears / thoughts that contribute to feeling anxious  practice setting boundaries three times in the next three weeks   Emotional differentiation - how to separate one's emotions from those of loved ones & how to protect self and still be supportive     Intervention:   CBT: identified nature/manifestation of anxiety and triggers  Solution Focused: identified current stressors and generated ideas about how to manage them        ASSESSMENT: Current Emotional / Mental Status (status of significant symptoms):   Risk status (Self / Other harm or suicidal ideation)   Patient denies current fears or concerns for personal safety.   Patient denies current or recent suicidal ideation or behaviors.   Patientdenies current or recent homicidal ideation or behaviors.   Patient denies current or recent self injurious behavior or ideation.   Patient denies other safety concerns.   Patient reports there has been no change in risk factors since their last session.     Patientreports there has been no change in protective factors since their last session.     Recommended that patient call 911 or go to the local ED should there be a change in any of these risk factors.     Appearance:   Appropriate    Eye Contact:   Good    Psychomotor Behavior: Normal    Attitude:   Cooperative     Orientation:   All   Speech    Rate / Production: Normal/ Responsive    Volume:  Normal    Mood:    Sad  Fearful   Affect:    Appropriate  Subdued    Thought Content:  Clear  Rumination    Thought Form:  Coherent  Goal Directed    Insight:    Fair      Medication Review:   No current psychiatric medications prescribed     Medication Compliance:   NA     Changes in Health Issues:   None reported     Chemical Use Review:  Substance Use: increase in alcohol.  Patient appears motivated to attend AA and reports that she plans to attend a meeting this evening.  Also provided resources for [employer] [facility] [employer] groups.     Tobacco Use: No current tobacco use.      Diagnosis:  1. Major depressive disorder, recurrent episode, mild (H)    2. Anxiety        Collateral Reports Completed:   Routed note to PCP    PLAN: (Patient Tasks / Therapist Tasks / Other)  Emailed patient resources on [employer] meetings and a link to a therapist specializing in ADHD.      Jenni Sarah                                                         ______________________________________________________________________    Treatment Plan    Patient's Name: Pao Rizzo  YOB: 1960    Date: 10/29/2020    DSM5 Diagnoses: 296.25 (F32.4)  Major Depressive Disorder, Single Episode, In partial remssion _ or 300.02 (F41.1) Generalized Anxiety Disorder  Psychosocial / Contextual Factors:  Has had bouts of heavy drinking in past 10 months; concerns about son's mental health and pending divorce; grief/loss, co-parenting issues re: adult sons, lack of appetite due to anxiety/low appetite/recent weight loss, some financial concerns; 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    AGATA-7 SCORE 12/4/2019 12/10/2019 10/29/2020   Total Score 8 (mild anxiety) - -   Total Score 8 1 5     PHQ 12/4/2019 12/10/2019 10/29/2020   PHQ-9 Total Score 8 3 8   Q9: Thoughts of better off dead/self-harm past 2 weeks Not at all  Not at all Not at all     WHODAS 2.0 Total Score 12/10/2019   Total Score 16       Referral / Collaboration:  Referral to another professional/service is not indicated at this time.  Will refer for CD assessment if needed in future.    Anticipated number of sessions for this episode of care: 10-12    Measurable Treatment Goal(s) related to diagnosis / functional impairment(s)  Goal 1: Patient will identify triggers for anxiety/depression and increase her protective factors    Objective #A (Patient Action)    Patient will identify at least 4-5 triggers for anxiety.  Status: Restarted - Date: 10/29/2020     Intervention(s)  Therapist will teach emotional recognition/identification.    Objective #B  Patient will identify 4-5 strategies for improving mood.  Status: Restarted - Date: 10/29/2020     Intervention(s)  Therapist will teach distraction skills.        Goal 2: Patient will reduce her use of alcohol and will practice daily mindful, healthy eating.    Objective #A (Patient Action)    Status: Restarted - Date: 10/29/2020     Patient will maintain sobriety for one month.    Intervention(s)  Therapist will provide educational materials on chemical use and refer for CD assessment if needed; patient will attend AA 2-3x/month.    Objective #B  Patient will utilize up to 5 techniques that help extend mealtime to 20-30 minutes: focus on slow breathing and eat with others and focus on conversation.    Status: Restarted - Date: 10/29/2020     Intervention(s)  Therapist will teach mindfulness skills.      Goal 3: Patient will improve her ability to set and enforce boundaries with her fiance and their boys.    Objective #A (Patient Action)    Status: Restarted - Date: 10/29/2020     Patient will compile a list of boundaries that they would like to set with others.    Intervention(s)  Therapist will teach how to choose an intensity for asking or saying 'no'.    Objective #B  Patient will practice setting boundaries 5 times in  the next 5 weeks.    Status: Restarted - Date: 10/29/2020     Intervention(s)  Therapist will teach how to choose an intensity for asking or saying 'no'.    Goal 4 : Patient will identify unresolved grief and loss related to the death of her parents and the loss of her job.    Objective #A (Client Action)    Client will identify how the use of substances contributes to the avoidance of feeling associated with the loss.  Status: Restarted - Date: 10/29/2020     Intervention(s)  Therapist will provide educational materials on AA/support groups if needed.    Objective #B  Client will increase understanding of steps in the grief process.    Status: Restarted - Date: 10/29/2020     Intervention(s)  Therapist will teach stages of grief and tasks of mourning.      Patient has reviewed and agreed to the above plan.      Jenni Sarah

## 2020-10-30 ASSESSMENT — ANXIETY QUESTIONNAIRES: GAD7 TOTAL SCORE: 5

## 2020-11-13 ENCOUNTER — VIRTUAL VISIT (OUTPATIENT)
Dept: FAMILY MEDICINE | Facility: OTHER | Age: 60
End: 2020-11-13

## 2020-11-13 NOTE — PROGRESS NOTES
"Date: 2020 15:07:33  Clinician: Thomas Amezquita  Clinician NPI: [id number]  Patient: Pao Rizzo  Patient : 1960  Patient Address: [address] [place] [address] [place] [address]  Patient Phone: [phone]  Visit Protocol: URI  Patient Summary:  Pao is a 60 year old ( : 1960 ) female who initiated a [facility] Visit for COVID-19 (Coronavirus) evaluation and screening. When asked the question \"Please sign me up to receive news, health information and promotions. \", Pao responded \"No\".    Pao states her symptoms started suddenly 3-4 days ago.   Her symptoms consist of myalgia, chills, malaise, diarrhea, and a headache. Pao also feels feverish.   Symptom details     Temperature: Her current temperature is 97.9 degrees Fahrenheit.     Headache: She states the headache is mild (1-3 on a 10 point pain scale).      Pao denies having vomiting, rhinitis, facial pain or pressure, sore throat, teeth pain, ageusia, ear pain, wheezing, cough, nasal congestion, nausea, and anosmia. She also denies taking antibiotic medication in the past month, having recent facial or sinus surgery in the past 60 days, double sickening (worsening symptoms after initial improvement), and having a sinus infection within the past year. She is not experiencing dyspnea.   Precipitating events  She has not recently been exposed to someone with influenza. Pao has not been in close contact with any high risk individuals.   Pertinent COVID-19 (Coronavirus) information  Pao does not work or volunteer as healthcare worker or a . In the past 14 days, Pao has not worked or volunteered at a healthcare facility or group living setting.   In the past 14 days, she also has not lived in a congregate living setting.   Pao has not had a close contact with a laboratory-confirmed COVID-19 patient within 14 days of symptom onset.    Since 2019, Pao has not been tested for COVID-19 and has not " had upper respiratory infection or influenza-like illness.   Pertinent medical history  Pao does not get yeast infections when she takes antibiotics.   Pao does not need a return to work/school note.   Weight: 121 lbs   Pao does not smoke or use smokeless tobacco.   Additional information as reported by the patient (free text): Started throwing up at 4:30 am on Tuesday.  Kept throwing up until 9 pm.  Chills and then sweats since then.  Ringing in my head and feel light headed.   Weight: 121 lbs    MEDICATIONS: No current medications, ALLERGIES: Sulfa (Sulfonamide Antibiotics)  Clinician Response:  Dear Pao,   Your symptoms show that you may have coronavirus (COVID-19). This illness can cause fever, cough and trouble breathing. Many people get a mild case and get better on their own. Some people can get very sick.  What should I do?  We would like to test you for this virus.   1. Please call [phone] to schedule your visit. Explain that you were referred by [facility] to have a COVID-19 test. Be ready to share your [facility] visit ID number.  * If you need to schedule in [facility] please call [phone] or for [employer] employees please call [phone].  * If you need to schedule in the [place] area please call [phone]. [place] employees call [phone].  The following will serve as your written order for this COVID Test, ordered by me, for the indication of suspected COVID [Z20.828]: The test will be ordered in [facility], our electronic health record, after you are scheduled. It will show as ordered and authorized by Jeferson Chowdhury MD.  Order: COVID-19 (Coronavirus) PCR for SYMPTOMATIC testing from [facility].   2. When it's time for your COVID test:  Stay at least 6 feet away from others. (If someone will drive you to your test, stay in the backseat, as far away from the  as you can.)   Cover your mouth and nose with a mask, tissue or washcloth.  Go straight to the testing site. Don't make any stops  "on the way there or back.      3.Starting now: Stay home and away from others (self-isolate) until:   You've had no fever---and no medicine that reduces fever---for one full day (24 hours). And...   Your other symptoms have gotten better. For example, your cough or breathing has improved. And...   At least 10 days have passed since your symptoms started.       During this time, don't leave the house except for testing or medical care.   Stay in your own room, even for meals. Use your own bathroom if you can.   Stay away from others in your home. No hugging, kissing or shaking hands. No visitors.  Don't go to work, school or anywhere else.    Clean \"high touch\" surfaces often (doorknobs, counters, handles, etc.). Use a household cleaning spray or wipes. You'll find a full list of  on the EPA website: www.epa.gov/pesticide-registration/list-n-disinfectants-use-against-sars-cov-2.   Cover your mouth and nose with a mask, tissue or washcloth to avoid spreading germs.  Wash your hands and face often. Use soap and water.  Caregivers in these groups are at risk for severe illness due to COVID-19:  o People 65 years and older  o People who live in a nursing home or long-term care facility  o People with chronic disease (lung, heart, cancer, diabetes, kidney, liver, immunologic)  o People who have a weakened immune system, including those who:   Are in cancer treatment  Take medicine that weakens the immune system, such as corticosteroids  Had a bone marrow or organ transplant  Have an immune deficiency  Have poorly controlled HIV or AIDS  Are obese (body mass index of 40 or higher)  Smoke regularly   o Caregivers should wear gloves while washing dishes, handling laundry and cleaning bedrooms and bathrooms.  o Use caution when washing and drying laundry: Don't shake dirty laundry, and use the warmest water setting that you can.  o For more tips, go to www.cdc.gov/coronavirus/2019-   Get lots of rest. Drink extra " fluids (unless a doctor has told you not to).   Take Tylenol (acetaminophen) for fever or pain. If you have liver or kidney problems, ask your family doctor if it's okay to take Tylenol.   Adults can take either:    650 mg (two 325 mg pills) every 4 to 6 hours, or...   1,000 mg (two 500 mg pills) every 8 hours as needed.    Note: Don't take more than 3,000 mg in one day. Acetaminophen is found in many medicines (both prescribed and over-the-counter medicines). Read all labels to be sure you don't take too much.   For children, check the Tylenol bottle for the right dose. The dose is based on the child's age or weight.    If you have other health problems (like cancer, heart failure, an organ transplant or severe kidney disease): Call your specialty clinic if you don't feel better in the next 2 days.       Know when to call 911. Emergency warning signs include:    Trouble breathing or shortness of breath Pain or pressure in the chest that doesn't go away Feeling confused like you haven't felt before, or not being able to wake up Bluish-colored lips or face.  Where can I get more information?   [facility] -- About COVID-19: [email or web address]   CDC -- What to Do If You're Sick: www.cdc.gov/coronavirus/2019-ncov/about/steps-when-sick.html   CDC -- Ending Home Isolation: www.cdc.gov/coronavirus/2019-ncov/hcp/disposition-in-home-patients.html   CDC -- Caring for Someone: www.cdc.gov/coronavirus/2019-ncov/if-you-are-sick/care-for-someone.html   [facility] -- Interim Guidance for Hospital Discharge to Home: www.health.[facility].[place]/diseases/coronavirus/hcp/hospdischarge.pdf   [facility] clinical trials (COVID-19 research studies): clinicalaffairs.[facility]/umn-clinical-trials    Below are the COVID-19 hotlines at the [place] Department of [facility] ([facility]). Interpreters are available.    For health questions: Call 481-249-4458 or 1-406.758.5727 (7 a.m. to 7 p.m.) For questions about schools and  childcare: Call 309-334-6029 or 1-692.163.8641 (7 a.m. to 7 p.m.)    Diagnosis: Contact with and (suspected) exposure to other viral communicable diseases  Diagnosis ICD: Z20.828

## 2020-11-14 DIAGNOSIS — Z20.822 SUSPECTED COVID-19 VIRUS INFECTION: Primary | ICD-10-CM

## 2020-11-15 DIAGNOSIS — Z20.822 SUSPECTED COVID-19 VIRUS INFECTION: ICD-10-CM

## 2020-11-15 PROCEDURE — U0003 INFECTIOUS AGENT DETECTION BY NUCLEIC ACID (DNA OR RNA); SEVERE ACUTE RESPIRATORY SYNDROME CORONAVIRUS 2 (SARS-COV-2) (CORONAVIRUS DISEASE [COVID-19]), AMPLIFIED PROBE TECHNIQUE, MAKING USE OF HIGH THROUGHPUT TECHNOLOGIES AS DESCRIBED BY CMS-2020-01-R: HCPCS | Performed by: PHYSICIAN ASSISTANT

## 2020-11-17 LAB
SARS-COV-2 RNA SPEC QL NAA+PROBE: ABNORMAL
SPECIMEN SOURCE: ABNORMAL

## 2021-01-15 ENCOUNTER — HEALTH MAINTENANCE LETTER (OUTPATIENT)
Age: 61
End: 2021-01-15

## 2021-02-10 ENCOUNTER — FCC EXTENDED DOCUMENTATION (OUTPATIENT)
Dept: PSYCHOLOGY | Facility: CLINIC | Age: 61
End: 2021-02-10

## 2021-02-10 NOTE — PROGRESS NOTES
Discharge Summary  Multiple Sessions    Client Name: Pao Rizzo MRN#: [id number] YOB: 1960      Intake / Discharge Date: 12/10/19 - 10/29/2020 (three sessions total)      DSM5 Diagnoses: (Sustained by DSM5 Criteria Listed Above)  Diagnoses: 296.25 (F32.4)  Major Depressive Disorder, Single Episode, In partial remssion _  300.02 (F41.1) Generalized Anxiety Disorder  Psychosocial & Contextual Factors: Grief/loss, co-parenting issues re: adult sons, lack of appetite due to anxiety/recent weight loss, job loss during past year/no current employment  WHODAS 2.0 Total Score 12/10/2019   Total Score 16     PHQ 12/4/2019 12/10/2019 10/29/2020   PHQ-9 Total Score 8 3 8   Q9: Thoughts of better off dead/self-harm past 2 weeks Not at all Not at all Not at all     AGATA-7 SCORE 12/4/2019 12/10/2019 10/29/2020   Total Score 8 (mild anxiety) - -   Total Score 8 1 5          Presenting Concern:  Concerns about son, anxiety (provider concerned about liver functioning/alcohol use)      Reason for Discharge:  Client did not return      Disposition at Time of Last Encounter:   Comments:   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      Referred To:  PCP and possible CD ; patient is welcome to resume services with [facility] in the future.        Jenni Sarah   2/10/2021

## 2021-10-24 ENCOUNTER — HEALTH MAINTENANCE LETTER (OUTPATIENT)
Age: 61
End: 2021-10-24

## 2022-02-13 ENCOUNTER — HEALTH MAINTENANCE LETTER (OUTPATIENT)
Age: 62
End: 2022-02-13

## 2022-10-16 ENCOUNTER — HEALTH MAINTENANCE LETTER (OUTPATIENT)
Age: 62
End: 2022-10-16

## 2023-03-26 ENCOUNTER — HEALTH MAINTENANCE LETTER (OUTPATIENT)
Age: 63
End: 2023-03-26